# Patient Record
Sex: FEMALE | Race: WHITE
[De-identification: names, ages, dates, MRNs, and addresses within clinical notes are randomized per-mention and may not be internally consistent; named-entity substitution may affect disease eponyms.]

---

## 2018-03-19 ENCOUNTER — HOSPITAL ENCOUNTER (EMERGENCY)
Dept: HOSPITAL 62 - ER | Age: 53
Discharge: HOME | End: 2018-03-19
Payer: COMMERCIAL

## 2018-03-19 VITALS — DIASTOLIC BLOOD PRESSURE: 84 MMHG | SYSTOLIC BLOOD PRESSURE: 145 MMHG

## 2018-03-19 DIAGNOSIS — R00.2: Primary | ICD-10-CM

## 2018-03-19 DIAGNOSIS — I10: ICD-10-CM

## 2018-03-19 DIAGNOSIS — F17.200: ICD-10-CM

## 2018-03-19 LAB
ADD MANUAL DIFF: NO
ALBUMIN SERPL-MCNC: 4.2 G/DL (ref 3.5–5)
ALP SERPL-CCNC: 113 U/L (ref 38–126)
ALT SERPL-CCNC: 43 U/L (ref 9–52)
ANION GAP SERPL CALC-SCNC: 9 MMOL/L (ref 5–19)
AST SERPL-CCNC: 30 U/L (ref 14–36)
BASOPHILS # BLD AUTO: 0.1 10^3/UL (ref 0–0.2)
BASOPHILS NFR BLD AUTO: 0.9 % (ref 0–2)
BILIRUB DIRECT SERPL-MCNC: 0.3 MG/DL (ref 0–0.4)
BILIRUB SERPL-MCNC: 0.5 MG/DL (ref 0.2–1.3)
BUN SERPL-MCNC: 8 MG/DL (ref 7–20)
CALCIUM: 9.3 MG/DL (ref 8.4–10.2)
CHLORIDE SERPL-SCNC: 106 MMOL/L (ref 98–107)
CK SERPL-CCNC: 73 U/L (ref 30–135)
CO2 SERPL-SCNC: 25 MMOL/L (ref 22–30)
EOSINOPHIL # BLD AUTO: 0.2 10^3/UL (ref 0–0.6)
EOSINOPHIL NFR BLD AUTO: 2.2 % (ref 0–6)
ERYTHROCYTE [DISTWIDTH] IN BLOOD BY AUTOMATED COUNT: 14.8 % (ref 11.5–14)
GLUCOSE SERPL-MCNC: 93 MG/DL (ref 75–110)
HCT VFR BLD CALC: 43.4 % (ref 36–47)
HGB BLD-MCNC: 14.6 G/DL (ref 12–15.5)
LYMPHOCYTES # BLD AUTO: 2 10^3/UL (ref 0.5–4.7)
LYMPHOCYTES NFR BLD AUTO: 26.9 % (ref 13–45)
MCH RBC QN AUTO: 31.3 PG (ref 27–33.4)
MCHC RBC AUTO-ENTMCNC: 33.7 G/DL (ref 32–36)
MCV RBC AUTO: 93 FL (ref 80–97)
MONOCYTES # BLD AUTO: 0.5 10^3/UL (ref 0.1–1.4)
MONOCYTES NFR BLD AUTO: 6.4 % (ref 3–13)
NEUTROPHILS # BLD AUTO: 4.7 10^3/UL (ref 1.7–8.2)
NEUTS SEG NFR BLD AUTO: 63.6 % (ref 42–78)
NT PRO BNP: 42 PG/ML (ref 5–900)
PLATELET # BLD: 235 10^3/UL (ref 150–450)
POTASSIUM SERPL-SCNC: 4.1 MMOL/L (ref 3.6–5)
PROT SERPL-MCNC: 7 G/DL (ref 6.3–8.2)
RBC # BLD AUTO: 4.68 10^6/UL (ref 3.72–5.28)
SODIUM SERPL-SCNC: 139.9 MMOL/L (ref 137–145)
TOTAL CELLS COUNTED % (AUTO): 100 %
TROPONIN I SERPL-MCNC: < 0.012 NG/ML
WBC # BLD AUTO: 7.3 10^3/UL (ref 4–10.5)

## 2018-03-19 PROCEDURE — 84484 ASSAY OF TROPONIN QUANT: CPT

## 2018-03-19 PROCEDURE — 99284 EMERGENCY DEPT VISIT MOD MDM: CPT

## 2018-03-19 PROCEDURE — 36415 COLL VENOUS BLD VENIPUNCTURE: CPT

## 2018-03-19 PROCEDURE — 83880 ASSAY OF NATRIURETIC PEPTIDE: CPT

## 2018-03-19 PROCEDURE — 82550 ASSAY OF CK (CPK): CPT

## 2018-03-19 PROCEDURE — 85379 FIBRIN DEGRADATION QUANT: CPT

## 2018-03-19 PROCEDURE — 93010 ELECTROCARDIOGRAM REPORT: CPT

## 2018-03-19 PROCEDURE — 71046 X-RAY EXAM CHEST 2 VIEWS: CPT

## 2018-03-19 PROCEDURE — 85025 COMPLETE CBC W/AUTO DIFF WBC: CPT

## 2018-03-19 PROCEDURE — 80053 COMPREHEN METABOLIC PANEL: CPT

## 2018-03-19 PROCEDURE — 93005 ELECTROCARDIOGRAM TRACING: CPT

## 2018-03-19 NOTE — ER DOCUMENT REPORT
ED General





- General


Chief Complaint: Dizziness


Stated Complaint: DIZZY, SHORTNESS OF BREATH, BLOOD PRESSURE ISSUE


Time Seen by Provider: 03/19/18 12:57


Notes: 


The patient is a 53-year-old female, past medical history hypertension, current 

smoker, presents with multiple complaints.  Over the past several days, she has 

been feeling like her heart is racing.  She is feeling mildly short of breath 

yesterday and went to [facility] where her blood pressure was 190/100.  She has 

an appointment with her primary care physician this week to discuss starting 

blood pressure medications because of prior high values in the past, but she is 

not on any blood pressure medications currently.  Patient is drinking a 32 

ounce caffeinated drink in the emergency room and says that she is feeling like 

her heart is racing, but her heart rate is 72.  She denies syncope, leg swelling

, nausea, vomiting, hemoptysis, rash, back pain, chest pain or headache.


TRAVEL OUTSIDE OF THE [place] IN LAST 30 DAYS: No





- Related Data


Allergies/Adverse Reactions: 


 





No Known Allergies Allergy (Verified 03/19/18 12:27)


 











Past Medical History





- General


Information source: Patient





- Social History


Smoking Status: Current Every Day Smoker


Family History: Reviewed & Not Pertinent


Patient has suicidal ideation: No


Patient has homicidal ideation: No


Endocrine Medical History: Reports: Hx Hypothyroidism


Renal/ Medical History: Denies: Hx Peritoneal Dialysis


Psychiatric Medical History: Reports: Hx Depression


Past Surgical History: Reports: Hx Appendectomy, Hx Orthopedic Surgery - 

Recently had pins placed in the right fourth and fifth fingers





- Immunizations


Hx Diphtheria, Pertussis, Tetanus Vaccination: Yes





Review of Systems





- Review of Systems


Notes: 


REVIEW OF SYSTEMS:


CONSTITUTIONAL: -fevers, -chills


EENT: -eye pain, -difficulty swallowing, -nasal congestion


CARDIOVASCULAR: -chest pain, -syncope, +palpitations


RESPIRATORY: -cough, -SOB


GASTROINTESTINAL: -abdominal pain, -nausea, -vomiting, -diarrhea


GENITOURINARY: -dysuria, -hematuria


MUSCULOSKELETAL: -back pain, -neck pain


SKIN: -rash or skin lesions.


HEMATOLOGIC: -easy bruising or bleeding.


LYMPHATIC: -swollen, enlarged glands.


NEUROLOGICAL: -altered mental status or loss of consciousness, -headache, -

neurologic symptoms


PSYCHIATRIC: -anxiety, -depression.


ALL OTHER SYSTEMS REVIEWED AND NEGATIVE.





Physical Exam





- Vital signs


Vitals: 


 











Temp Pulse Resp BP Pulse Ox


 


 98.7 F   71   16   186/96 H  98 


 


 03/19/18 12:46  03/19/18 12:46  03/19/18 12:46  03/19/18 12:46  03/19/18 12:46














- Notes


Notes: 


PHYSICAL EXAMINATION:


GENERAL: Well-appearing, well-nourished and in no acute distress.


HEAD: Atraumatic, normocephalic.


EYES: Pupils equal round and reactive to light, extraocular movements intact, 

sclera anicteric, conjunctiva are normal.


ENT: nares patent, oropharynx clear without exudates.  Moist mucous membranes.


NECK: Normal range of motion, supple without lymphadenopathy


LUNGS: Breath sounds clear to auscultation bilaterally and equal.  No wheezes 

rales or rhonchi.


HEART: Regular rate and rhythm without murmurs


ABDOMEN: Soft, nontender, normoactive bowel sounds.  No guarding, no rebound.  

No masses appreciated.


EXTREMITIES: Normal range of motion, no pitting or edema.  No cyanosis. Strong 

distal pulses.


NEUROLOGICAL: Cranial nerves grossly intact.  Normal speech, normal gait.  

Normal sensory and motor exams.


PSYCH: Normal mood, normal affect.


SKIN: Warm, Dry, normal turgor, no rashes or lesions noted.





Course





- Re-evaluation


Re-evalutation: 


Pt appears well. She is initially hypertensive, but after rest improves to 145/

85 without any medications.  Blood work is unremarkable, including a negative d-

dimer due to the shortness of breath and dizziness that she was experiencing.  

EKG and chest x-ray did not show any concerning abnormalities.  Patient is 

drinking a 32 ounce caffeinated drink in the emergency room and told her that 

this may be leading to feelings of her heart racing, even though her cardiac 

monitor shows sinus rhythm at 72 without any concerning arrhythmias seen.  Told 

her to try to cut down on her smoking and caffeine use and follow-up with her 

primary care physician as a rescheduled this week to discuss blood pressure 

management.





- Vital Signs


Vital signs: 


 











Temp Pulse Resp BP Pulse Ox


 


 98.7 F   71   15   145/84 H  98 


 


 03/19/18 12:46  03/19/18 12:46  03/19/18 14:01  03/19/18 14:01  03/19/18 14:01














- Laboratory


Result Diagrams: 


 03/19/18 13:10





 03/19/18 13:10


Laboratory results interpreted by me: 


 











  03/19/18





  13:10


 


RDW  14.8 H














- Diagnostic Test


Radiology reviewed: Image reviewed, Reports reviewed


Radiology results interpreted by me: 


CXR: NAD





- EKG Interpretation by Me


EKG shows normal: Sinus rhythm, Axis, Intervals, QRS Complexes, ST-T Waves


When compared to previous EKG there are: No significant change





Discharge





- Discharge


Clinical Impression: 


 Palpitations





Hypertension


Qualifiers:


 Hypertension type: unspecified Qualified Code(s): I10 - Essential (primary) 

hypertension





Condition: Stable


Disposition: HOME, SELF-CARE


Additional Instructions: 


Your blood pressure in the emergency room is 145/85.  Have your blood pressure 

rechecked by your primary care physician to see if you need any blood pressure 

medications. Try to cut down on your caffeine intake.





Palpitations (Irregular/Rapid Heartrate)





     Irregular or rapid heartbeat is called "palpitation."  To diagnose the 

cause of palpitation, we have to "catch it in the act" with an EKG.


     Sinus Tachycardia:  This is a rapid (but NORMAL) rhythm that can be due to 

fever, pain, anxiety, lack of sleep, over-exertion, or drugs. Cold medications, 

caffeine, and diet pills are particularly likely to cause tachycardia.  Usually

, all that's required is rest, reassurance, and avoiding caffeine, alcohol, 

nicotine, and unnecessary medicines.


     Paroxysmal Atrial Tachycardia (PAT):  This abnormally rapid heartbeat is 

caused by a "short circuit" in the electrical system of the heart.  It is not 

dangerous, unless other heart disease is present. These attacks of PAT may 

occur occasionally for years.  Medication is available for treatment.


     Paroxysmal Atrial Fibrillation or Atrial Flutter:  This is irregular 

electrical activity in the upper heart chamber.  These abnormal rhythms often 

occur with valve disease or in hearts damaged by hardening of the arteries.  

These rhythms usually require further testing, for example a cardiac echo.


     Premature Beats:  Extra beats occur more commonly after caffeine, nicotine

, alcohol, cold pills, diet pills.  Emotional stress or fatigue also provoke 

them.  Extra beats are only dangerous when heart disease is present.  They 

usually need no treatment.  If they're frequent, or if evidence of heart 

disease develops, medication can be given to suppress them.


     If we were unable to "catch" the palpitations on EKG, you should try to 

get an EKG immediately if the symptoms begin again.  Contact the physician at 

once if you develop persistent lightheadedness, shortness of breath, chest pain

, or swelling of the ankles.





DIZZINESS:


Under normal circumstances, your sense of balance is controlled by a number of 

signals that your brain receives from several locations:


   Eyes. No matter what your position, visual signals help you determine where 

your body is in space and how it's moving.





   Sensory nerves. These are in your skin, muscles and joints. Sensory nerves 

send messages to your brain about body movements and positions.





   Inner ear. The organ of balance in your inner ear is the vestibular 

labyrinth. It includes loop-shaped structures (semicircular canals) that 

contain fluid and fine, hair-like sensors that monitor the rotation of your 

head. Near the semicircular canals are the utricle and saccule, which contain 

tiny particles called otoconia ([id number]-[id number]). These particles are attached 

to sensors that help detect gravity and back-and-forth motion.





Good balance depends on at least two of these three sensory systems working 

well. For instance, closing your eyes while washing your hair in the shower 

doesn't mean you'll lose your balance. Signals from your inner ear and sensory 

nerves help keep you upright.


However, if your central nervous system can't process signals from all of these 

locations, if the messages are contradictory, or if the sensory systems aren't 

functioning properly, you may experience loss of balance.


Dizziness may have a number of potential causes. These may include:





Feeling of faintness (presyncope)


"Presyncope" is the medical term for feeling faint and lightheaded without 

losing consciousness. Sometimes nausea, pale skin and a sense of dizziness 

accompany a feeling of faintness. Causes of presyncope include:


   Drop in blood pressure (orthostatic hypotension). A dramatic drop in your 

systolic blood pressure - the higher number in your blood pressure reading - 

may result in lightheadedness or a feeling of faintness. It can occur after 

sitting up or standing too quickly.





   Inadequate output of blood from the heart. Conditions such as partially 

blocked arteries (atherosclerosis), disease of the heart muscle (cardiomyopathy)

, abnormal heart rhythm (arrhythmia) or a decrease in blood volume may cause 

inadequate blood flow from your heart.





Lightheadedness and other kinds of dizziness


Feeling lightheaded is the feeling of being "spaced out" or having the 

sensation of spinning inside your head. It can also give you the sensation that 

if your lightheadedness worsens, you might lose consciousness. Causes may 

include:


   Inner ear disorders. These abnormalities of your inner ear can lead to 

illusions of motion and make you feel like you're floating.





   Anxiety disorders. Certain anxiety disorders, such as panic attacks and a 

fear of leaving home or being in large, open spaces (agoraphobia), may cause 

lightheadedness.





   Hyperventilation. Abnormally rapid breathing that often accompanies anxiety 

disorders may make you feel lightheaded.








NORMAL EXAM AND WORKUP:


     At this time, your examination and workup show no significant abnormality.

  No significant abnormal physical findings were noted.  All laboratory, EKG, 

and imaging (x-ray, CT scans, ultrasound) studies that were ordered show no 

significant abnormality.


     Although your examination and all studies that were ordered showed no 

significant abnormal finding, there are no examinations and no studies that are 

100% accurate.  There is always the possibility that some abnormality could 

exist and not be detected with physical examination or within the limits and 

capabilities of laboratory and other studies.


     You should return or follow up as you were instructed on your visit today 

for further evaluation if your symptoms do not resolve.





FOLLOW-UP CARE:


If you have been referred to a physician for follow-up care, call the physician

s office for an appointment as you were instructed or within the next two days.

  If you experience worsening or a significant change in your symptoms, notify 

the physician immediately or return to the Emergency Department at any time for 

re-evaluation.


Forms:  Elevated Blood Pressure, Smoking Cessation Education


Referrals: 


ELIZA JOHN MD [ACTIVE STAFF] - Follow up as needed

## 2018-03-19 NOTE — RADIOLOGY REPORT (SQ)
EXAM DESCRIPTION:  CHEST PA/LAT



COMPLETED DATE/TIME:  3/19/2018 1:41 pm



REASON FOR STUDY:  SOB



COMPARISON:  None.



EXAM PARAMETERS:  NUMBER OF VIEWS: two views

TECHNIQUE: Digital Frontal and Lateral radiographic views of the chest acquired.

RADIATION DOSE: NA

LIMITATIONS: none



FINDINGS:  LUNGS AND PLEURA: No opacities, masses or pneumothorax. No pleural effusion.

MEDIASTINUM AND HILAR STRUCTURES: No masses or contour abnormalities.

HEART AND VASCULAR STRUCTURES: Heart normal size.  No evidence for failure.

BONES: Osteopenic.  No acute findings

HARDWARE: None in the chest.

OTHER: No other significant finding.



IMPRESSION:  NO SIGNIFICANT RADIOGRAPHIC FINDING IN THE CHEST.



TECHNICAL DOCUMENTATION:  JOB ID:  [id number]

 2011 [employer]- All Rights Reserved



Reading location - IP/workstation name: [email or web address]

## 2018-03-19 NOTE — EKG REPORT
SEVERITY:- BORDERLINE ECG -

SINUS RHYTHM

BORDERLINE T ABNORMALITIES, ANTERIOR LEADS

:

Confirmed by: Pennie Salcido 19-Mar-2018 23:31:30